# Patient Record
(demographics unavailable — no encounter records)

---

## 2025-02-26 NOTE — ASSESSMENT
[FreeTextEntry1] :  -History and exam consistent with a soft tissue mass of the left wrist.  This may represent a ganglion cyst.  He does have abnormal features not consistent with ganglion cyst given that the patient is 12 years old we need to rule out other causes of soft tissue mass, given the multi loculation, the somewhat rapid growth for the past few weeks MRI will be helpful to determine if soft tissue mass is fluid coming from the joint consistent with ganglion versus other mass which may require biopsy.  Her mother and father using the  verbalized understanding -Follow-up after the MRI is obtained

## 2025-02-26 NOTE — HISTORY OF PRESENT ILLNESS
[FreeTextEntry1] : 12-year-old female here for evaluation left wrist volar cyst.  She is here with her mother and father today who acts as her historian.  Patient's mother and father are [place]ik-speaking only and today's exam accomplished with the aid of a bedside .  She has had a wrist cyst/mass for several months now is recently been growing in size.  She reports it is not significantly painful other than to touch or if there is any pressure directly on it.  She is otherwise healthy.

## 2025-02-26 NOTE — PHYSICAL EXAM
[de-identified] : Left wrist and hand -On the volar aspect of the wrist there is a soft tissue mass with appearance of a ganglion cyst.  It is tender to palpation -Cyst transilluminates with a penlight -Firm and well-circumscribed not fixed to the skin, it is multiloculated there is 3 distinct lesions - Beau's test demonstrates-patent arch tested with release of both radial and ulnar arteries -Able to make a full composite fist. Denies numbness or tingling. [de-identified] : Three-view x-ray today including AP lateral and oblique were taken of the left wrist, the demonstrate a skeletally immature patient these do not show a clear evidence of cyst there is thickening of the soft tissue and denser region in the region of concern

## 2025-03-17 NOTE — PHYSICAL EXAM
[de-identified] : Left wrist and hand -On the volar radial aspect of the wrist there is a loculated volar ganglion cyst -Cyst transilluminates with a penlight -Firm and well-circumscribed not fixed to the skin, it is multiloculated there is 3 distinct lesions - Beau's test demonstrates-patent arch tested with release of both radial and ulnar arteries -Able to make a full composite fist. Denies numbness or tingling. -Negative Tinel's, palpable radial pulse -No numbness or tingling in the hand [de-identified] : MRI reviewed, this demonstrates a ganglion cyst emerging from the radiocarpal joint on the volar radial wrist, this is in contact with radial artery

## 2025-03-17 NOTE — HISTORY OF PRESENT ILLNESS
[FreeTextEntry1] : 12-year-old female here for follow-up, MRI review left wrist cyst.  She is here with her mother and father today.  Her parents are Greek-speaking only and today's exam accomplished with the aid of a .  Since last being seen they felt that this is slightly larger does wax and wane in size.  She reports no significant pain there is tenderness when there is direct pressure over it she reports.  No new symptoms denies numbness or tingling in the hand.

## 2025-03-17 NOTE — ASSESSMENT
[FreeTextEntry1] : 12-year-old female left volar ganglion cyst of the wrist  -Using the  I discussed with her parents a plan going forward.  We reviewed ganglion cyst in detail.  Her cyst is large there is occasional mechanical symptoms.  We did discuss in the pediatric population there is a chance of spontaneous recurrence of the cyst.  We discussed with surgical excision there is still a risk of recurrence -At this time point she is going to trial a wrist brace to wear at night we will continue to monitor her symptoms, she does have some tenderness in this region although it is not limiting at this time.  After discussion we decided to observe the cyst at this time with repeat examination in 6 to 8 weeks.  We discussed how they are not dangerous but it can cause pain or mechanical symptoms.  If the wrist brace does not provide symptomatic relief and it remains and we would consider surgical excision at the next visit while she is on summer break -All questions answered they will call with any questions concerns that arise or any changes.